# Patient Record
Sex: MALE | Race: AMERICAN INDIAN OR ALASKA NATIVE
[De-identification: names, ages, dates, MRNs, and addresses within clinical notes are randomized per-mention and may not be internally consistent; named-entity substitution may affect disease eponyms.]

---

## 2018-05-20 ENCOUNTER — HOSPITAL ENCOUNTER (EMERGENCY)
Dept: HOSPITAL 5 - ED | Age: 53
Discharge: HOME | End: 2018-05-20
Payer: COMMERCIAL

## 2018-05-20 VITALS — DIASTOLIC BLOOD PRESSURE: 80 MMHG | SYSTOLIC BLOOD PRESSURE: 119 MMHG
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
## 2018-05-20 DIAGNOSIS — G56.02: ICD-10-CM
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
## 2018-05-20 DIAGNOSIS — S66.912A: Primary | ICD-10-CM
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
## 2018-05-20 DIAGNOSIS — Z88.0: ICD-10-CM
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
## 2018-05-20 PROCEDURE — 99283 EMERGENCY DEPT VISIT LOW MDM: CPT

## 2018-05-20 NOTE — XRAY REPORT
Left hand 3 views:



History: Hand pain or swelling.



Findings:



No bony or articular abnormality. No fracture periosteal reaction lytic 

lesion or soft tissue calcification.



Impression:



Essentially negative left hand.

## 2018-05-20 NOTE — EMERGENCY DEPARTMENT REPORT
ED Upper Extremity Inj HPI





- General


Chief Complaint: Extremity Injury, Upper


Stated Complaint: LEFT HAND PAIN


Time Seen by Provider: 05/20/18 12:51


Source: patient


Mode of arrival: Ambulatory


Limitations: No Limitations





- History of Present Illness


Initial Comments: 





This is a 52-year-old male nontoxic, well nourished in appearance, no acute 

signs of distress presents to the ED with c/o of acute on chronic intermittent 

left wrist and hand pain with radiation to fingers with tingling sensation x1 

year.  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 

chicken at a factory. Patient denies any trauma.  Patient denies any joint 

redness, joint swelling, fever, chills, nausea, vomiting, chest pain or 

shortness breath.   Patient stated allergies to PCN. Denies any PMH.


MD Complaint: Injury to:: left, wrist, hand


-: year(s) (1)


Other Extremity Injury: Hand: Left, Wrist: Left


Other Injuries: none


Place: work


Severity scale (0 -10): 8


Improves With: immobilization


Worsens With: movement of extremity


Associated Symptoms: denies other symptoms.  denies: weakness, numbness, neck 

pain, suspects foreign body, nausea/vomiting, heard/felt popping sensat





- Related Data


 Previous Rx's











 Medication  Instructions  Recorded  Last Taken  Type


 


Ibuprofen [Motrin] 600 mg PO Q8H PRN #30 tablet 05/20/18 Unknown Rx











 Allergies











Allergy/AdvReac Type Severity Reaction Status Date / Time


 


Penicillins Allergy  Unknown Verified 05/20/18 11:27














ED Review of Systems


ROS: 


Stated complaint: LEFT HAND PAIN


Other details as noted in HPI





Constitutional: denies: chills, fever


Eyes: denies: eye pain, eye discharge, vision change


ENT: denies: ear pain, throat pain


Respiratory: denies: cough, shortness of breath, wheezing


Cardiovascular: denies: chest pain, palpitations


Endocrine: no symptoms reported


Gastrointestinal: denies: abdominal pain, nausea, diarrhea


Genitourinary: denies: urgency, dysuria


Musculoskeletal: arthralgia.  denies: back pain, joint swelling


Skin: denies: rash, lesions


Neurological: denies: headache, weakness, paresthesias


Psychiatric: denies: anxiety, depression


Hematological/Lymphatic: denies: easy bleeding, easy bruising





ED Past Medical Hx





- Past Medical History


Previous Medical History?: No





- Surgical History


Past Surgical History?: No





- Social History


Smoking Status: Never Smoker


Substance Use Type: None





- Medications


Home Medications: 


 Home Medications











 Medication  Instructions  Recorded  Confirmed  Last Taken  Type


 


Ibuprofen [Motrin] 600 mg PO Q8H PRN #30 tablet 05/20/18  Unknown Rx














ED Physical Exam





- General


Limitations: No Limitations


General appearance: alert, in no apparent distress





- Head


Head exam: Present: atraumatic, normocephalic





- Eye


Eye exam: Present: normal appearance


Pupils: Present: normal accommodation





- ENT


ENT exam: Present: normal exam, mucous membranes moist





- Neck


Neck exam: Present: normal inspection, full ROM.  Absent: tenderness, 

meningismus, lymphadenopathy





- Respiratory


Respiratory exam: Present: normal lung sounds bilaterally.  Absent: respiratory 

distress, wheezes, rales, rhonchi, stridor





- Cardiovascular


Cardiovascular Exam: Present: regular rate, normal rhythm, normal heart sounds.

  Absent: bradycardia, tachycardia, irregular rhythm, systolic murmur, 

diastolic murmur, rubs, gallop





- GI/Abdominal


GI/Abdominal exam: Present: soft, normal bowel sounds





- Rectal


Rectal exam: Present: deferred





- Extremities Exam


Extremities exam: Present: normal inspection, full ROM, tenderness, normal 

capillary refill.  Absent: joint swelling





- Expanded Upper Extremity Exam


  ** Left


General: Present: normal inspection


Shoulder Exam: Present: normal inspection, full ROM


Upper Arm exam: Present: normal inspection, full ROM


Elbow exam: Present: normal inspection, full ROM


Forearm Wrist exam: Present: normal inspection, full ROM, tenderness.  Absent: 

swelling, abrasion, laceration, ecchymosis, deformity, crepidus, dislocation, 

erythema, tenderness over anatomical snuff box, pain with axial thumb loading


Hand Wrist exam: Present: normal inspection, full ROM, tenderness, other (

Positive phalen's sign test).  Absent: swelling, abrasion, laceration, 

ecchymosis, deformity, crepidus, dislocation, erythema, amputation, nail 

avulsion, subungual hematoma


Neuro motor exam: Present: wrist extension intact, thumb opposition intact, 

thumb IP flexion intact, thumb adduction intact, fingers 2-5 abduction intact


Neurosensory exam: Present: 2-point discrimination, radial nerve intact, ulnar 

nerve intact, median nerve intact


Vascular: Present: vascular compromise, normal capillary refill, radial pulse, 

brachial pulse, ulnar pulse





- Back Exam


Back exam: Present: normal inspection, full ROM





- Neurological Exam


Neurological exam: Present: alert, oriented X3, normal gait





- Psychiatric


Psychiatric exam: Present: normal affect, normal mood





- Skin


Skin exam: Present: warm, dry, intact, normal color.  Absent: rash





ED Course


 Vital Signs











  05/20/18 05/20/18





  11:24 13:07


 


Temperature 97.9 F 


 


Pulse Rate 63 


 


Respiratory 16 18





Rate  


 


Blood Pressure 119/80 


 


O2 Sat by Pulse 98 





Oximetry  














- Reevaluation(s)


Reevaluation #1: 





05/20/18 13:17


Patient is speaking in full sentences with no signs of distress noted.


Reevaluation #2: 





05/20/18 13:51


Post splint assessment: neurovasular intact; normal cap refill <2 second; 

normal sensation; denies decreaed sensation; normal ROM of digits.





ED Medical Decision Making





- Medical Decision Making





This is a 52-year-old male that presents with left wrist/hand strain vs mild 

carpal tunnel syndrome.  Patient is stable and was examined by me.  As per up-to

-date level mild CTS is outpatient treatment with splint, NSAIDs, and RICE 

therapy.  I referred patient to an orthopedic doctor for further evaluation for 

possible MRI.  X-ray has been obtained and dictated by the radiologist.  

Patient is notified of the x-ray report with noted by the patient.  There is a 

positive phalens sign test of left hand.  There is no joint swelling.  No 

ecchymosis. no joint redness or swelling. Not warm to touch. No signs of 

cellulites present. Patient received a velcro wrist splint.  Patient was 

instructed to RICE therapy.  Patient received Motrin for pain.  Patient is 

discharged with Motrin. At time of discharge, the patient does not seem toxic 

or ill in appearance.  No acute signs of distress noted.  Patient agrees to 

discharge treatment plan of care.  No further questions noted by the patient.


Critical care attestation.: 


If time is entered above; I have spent that time in minutes in the direct care 

of this critically ill patient, excluding procedure time.








ED Disposition


Clinical Impression: 


 Carpal tunnel syndrome of left wrist





Strain of left wrist


Qualifiers:


 Encounter type: initial encounter Qualified Code(s): S66.912A - Strain of 

unspecified muscle, fascia and tendon at wrist and hand level, left hand, 

initial encounter





Strain of left hand


Qualifiers:


 Encounter type: initial encounter Qualified Code(s): S66.912A - Strain of 

unspecified muscle, fascia and tendon at wrist and hand level, left hand, 

initial encounter





Disposition: DC-01 TO HOME OR SELFCARE


Is pt being admited?: No


Does the pt Need Aspirin: No


Condition: Stable


Instructions:  Ibuprofen (By mouth), Carpal Tunnel Syndrome (ED), RICE Therapy (

ED)


Additional Instructions: 


Follow-up with a orthopedic doctor in 3-5 days or if symptoms worsen and 

continue return to emergency room as soon as possible. 


Prescriptions: 


Ibuprofen [Motrin] 600 mg PO Q8H PRN #30 tablet


 PRN Reason: Pain


Referrals: 


PRIMARY CARE,MD [Primary Care Provider] - 3-5 Days


HUNTER BABCOCK MD [Staff Physician] - 3-5 Days


[facility] [Outside] - 3-5 Days


[facility] [Outside] - 3-5 Days


Forms:  Work/School Release Form(ED)

## 2018-05-20 NOTE — XRAY REPORT
Left wrist 3 views:



History: Wrist pain.



Findings:



No bony or articular abnormality. No fracture dislocation or soft 

tissue calcification.



Impression:



No evidence of acute fracture.

## 2022-01-30 ENCOUNTER — HOSPITAL ENCOUNTER (EMERGENCY)
Dept: HOSPITAL 5 - ED | Age: 57
Discharge: LEFT BEFORE DISCHARGE | End: 2022-01-30
Payer: SELF-PAY

## 2022-01-30 DIAGNOSIS — R11.10: Primary | ICD-10-CM

## 2022-01-30 DIAGNOSIS — Z53.21: ICD-10-CM
